# Patient Record
Sex: MALE
[De-identification: names, ages, dates, MRNs, and addresses within clinical notes are randomized per-mention and may not be internally consistent; named-entity substitution may affect disease eponyms.]

---

## 2020-11-09 ENCOUNTER — APPOINTMENT (OUTPATIENT)
Dept: POPULATION HEALTH | Facility: CLINIC | Age: 55
End: 2020-11-09
Payer: COMMERCIAL

## 2020-11-09 DIAGNOSIS — Z80.1 FAMILY HISTORY OF MALIGNANT NEOPLASM OF TRACHEA, BRONCHUS AND LUNG: ICD-10-CM

## 2020-11-09 DIAGNOSIS — F41.9 ANXIETY DISORDER, UNSPECIFIED: ICD-10-CM

## 2020-11-09 DIAGNOSIS — Z28.21 IMMUNIZATION NOT CARRIED OUT BECAUSE OF PATIENT REFUSAL: ICD-10-CM

## 2020-11-09 DIAGNOSIS — Z78.9 OTHER SPECIFIED HEALTH STATUS: ICD-10-CM

## 2020-11-09 DIAGNOSIS — Z87.891 PERSONAL HISTORY OF NICOTINE DEPENDENCE: ICD-10-CM

## 2020-11-09 PROBLEM — Z00.00 ENCOUNTER FOR PREVENTIVE HEALTH EXAMINATION: Status: ACTIVE | Noted: 2020-11-09

## 2020-11-09 PROCEDURE — 99205 OFFICE O/P NEW HI 60 MIN: CPT | Mod: 95

## 2020-11-09 RX ORDER — MIRTAZAPINE 15 MG/1
15 TABLET, FILM COATED ORAL
Refills: 0 | Status: ACTIVE | COMMUNITY

## 2020-11-09 RX ORDER — LEVOTHYROXINE SODIUM 50 UG/1
50 TABLET ORAL
Refills: 0 | Status: ACTIVE | COMMUNITY

## 2020-11-09 RX ORDER — ATORVASTATIN CALCIUM 40 MG/1
40 TABLET, FILM COATED ORAL
Refills: 0 | Status: ACTIVE | COMMUNITY

## 2020-11-09 RX ORDER — ASPIRIN 81 MG
81 TABLET, DELAYED RELEASE (ENTERIC COATED) ORAL
Refills: 0 | Status: ACTIVE | COMMUNITY

## 2020-11-09 NOTE — HISTORY OF PRESENT ILLNESS
[Home] : at home, [unfilled] , at the time of the visit. [Medical Office: ([facility])___] : at the medical office located in  [Verbal consent obtained from patient] : the patient, [unfilled] [FreeTextEntry1] : 54 yo m pmh with CVA, Hashimoto's thyroiditis, HCL, hyperbilirubinemia for telehealth visit regarding longstanding exposure to PFAS-contaminated drinking water and is concerned about its impact on health.\par \par \par Has been in [place] since [id number]\par Pool filled with local water [phone].\par Couple of years had vegetable garden. \par Went to school in [place]\par Family there: Father  of lung. Mother  of CHF\par \par [facility] since . \par Pool since .\par No vegetable garden but herbs.\par Family hx: Wife had breast cancer \par Three children--son autism; daughters healthy though one had IUGR\par Serum levels checked  and had elevated PFHxS. Wife's levels were excessively high for multiple PFAS chems, son had elevations too. Daughters not tested.\par \par Daily marijuana after work. \par \par Occ hx:  in [place] area. \par \par No unusual hobbies/activities/exposures

## 2020-11-09 NOTE — ASSESSMENT
[FreeTextEntry1] : 54 yo m pmh with CVA, Hashimoto's thyroiditis, HCL, hyperbilirubinemia for telehealth visit regarding longstanding exposure to PFAS-contaminated drinking water which resulted in increased body burden and subsequently likely contributed his cholesterol and thyroid abnormalities. While his serum testing was modestly elevated it was done >2 y after the exposure ended and thus fails to reflect what was a substantial exposure in terms of years, work and home, etc, as reflected by his wife's excessively high levels.\par \par Once in the body, PFAS compounds can remain in the body for extended periods potentially for decades. PFAS water levels in that area have been shown to be much higher than what [facility] is advising. \par PFAS exposure has been established to result in increased risk of the following health effects:\par ~ Hepatocellular injury \par ~ Alterations in cholesterol metabolism \par ~ Alterations in thyroid function \par ~ kidney cancer, testicular cancer (men)\par ~impaired kidney function\par ~ Alterations in androgen levels \par ~ Alterations of uric acid \par ~ Fertility and with gestational growth, pre-eclampsia\par ~diabetes\par ~obesity\par ~asthma\par ~neurobehavioral disorders\par \par However, given the long half-life still at risk for the non-cancer outcomes and the elevated risk of kidney and testicular cancer will remain for a lifetime. Once the pandemic is over, I will assess for evidence of PFAS effects through indirect measures but in meantime have asked pt to send recent lab work. If all is wnl, then recommending return for evaluation again in a year. \par Repeat measurement of body burden of PFAS is not readily available at this time. If exposed, the only acceptable intervention to reduce body burden is eliminating exposure.  Will send me prior PFAS serum testing. Once I receive the serum PFAS levels will review.\par Had extensive discussion with pt about all of the above. All questions answered. I advised that vigilance about health, rather than anxiety or panic, was warranted. Advised good f/u with PMD and staying on top of cancer screenings.\par \par Pt understood and agreed with the plan. \par RTC 1 y unless clinical or exposure hx changes\par